# Patient Record
Sex: FEMALE | Race: WHITE | NOT HISPANIC OR LATINO | Employment: OTHER | URBAN - METROPOLITAN AREA
[De-identification: names, ages, dates, MRNs, and addresses within clinical notes are randomized per-mention and may not be internally consistent; named-entity substitution may affect disease eponyms.]

---

## 2017-03-27 ENCOUNTER — FOLLOW UP (OUTPATIENT)
Dept: URBAN - METROPOLITAN AREA CLINIC 33 | Facility: CLINIC | Age: 82
End: 2017-03-27

## 2017-03-27 DIAGNOSIS — H34.8320: ICD-10-CM

## 2017-03-27 DIAGNOSIS — H35.61: ICD-10-CM

## 2017-03-27 DIAGNOSIS — H43.392: ICD-10-CM

## 2017-03-27 DIAGNOSIS — E11.9: ICD-10-CM

## 2017-03-27 DIAGNOSIS — H35.3211: ICD-10-CM

## 2017-03-27 DIAGNOSIS — H35.3122: ICD-10-CM

## 2017-03-27 PROCEDURE — G8427 DOCREV CUR MEDS BY ELIG CLIN: HCPCS

## 2017-03-27 PROCEDURE — 2019F DILATED MACUL EXAM DONE: CPT

## 2017-03-27 PROCEDURE — 4177F TALK PT/CRGVR RE AREDS PREV: CPT

## 2017-03-27 PROCEDURE — 92250 FUNDUS PHOTOGRAPHY W/I&R: CPT

## 2017-03-27 PROCEDURE — 2022F DILAT RTA XM EVC RTNOPTHY: CPT

## 2017-03-27 PROCEDURE — 92014 COMPRE OPH EXAM EST PT 1/>: CPT

## 2017-03-27 PROCEDURE — 1036F TOBACCO NON-USER: CPT

## 2017-03-27 ASSESSMENT — VISUAL ACUITY
OS_SC: 20/80
OD_SC: CF 1FT

## 2017-03-27 ASSESSMENT — TONOMETRY
OD_IOP_MMHG: 12
OS_IOP_MMHG: 13

## 2017-07-03 ENCOUNTER — FOLLOW UP (OUTPATIENT)
Dept: URBAN - METROPOLITAN AREA CLINIC 33 | Facility: CLINIC | Age: 82
End: 2017-07-03

## 2017-07-03 VITALS
BODY MASS INDEX: 24.1 KG/M2 | WEIGHT: 136 LBS | HEART RATE: 61 BPM | HEIGHT: 63 IN | SYSTOLIC BLOOD PRESSURE: 120 MMHG | DIASTOLIC BLOOD PRESSURE: 56 MMHG

## 2017-07-03 DIAGNOSIS — H35.61: ICD-10-CM

## 2017-07-03 DIAGNOSIS — H35.3211: ICD-10-CM

## 2017-07-03 DIAGNOSIS — H43.392: ICD-10-CM

## 2017-07-03 DIAGNOSIS — H34.8320: ICD-10-CM

## 2017-07-03 DIAGNOSIS — E11.9: ICD-10-CM

## 2017-07-03 DIAGNOSIS — H35.3122: ICD-10-CM

## 2017-07-03 PROCEDURE — 92250 FUNDUS PHOTOGRAPHY W/I&R: CPT

## 2017-07-03 PROCEDURE — 1036F TOBACCO NON-USER: CPT

## 2017-07-03 PROCEDURE — 92014 COMPRE OPH EXAM EST PT 1/>: CPT

## 2017-07-03 PROCEDURE — 4177F TALK PT/CRGVR RE AREDS PREV: CPT

## 2017-07-03 PROCEDURE — 2019F DILATED MACUL EXAM DONE: CPT

## 2017-07-03 PROCEDURE — 67028 INJECTION EYE DRUG: CPT

## 2017-07-03 PROCEDURE — G8420 CALC BMI NORM PARAMETERS: HCPCS

## 2017-07-03 PROCEDURE — 2022F DILAT RTA XM EVC RTNOPTHY: CPT

## 2017-07-03 PROCEDURE — G8427 DOCREV CUR MEDS BY ELIG CLIN: HCPCS

## 2017-07-03 ASSESSMENT — TONOMETRY
OS_IOP_MMHG: 15
OD_IOP_MMHG: 11

## 2017-07-03 ASSESSMENT — VISUAL ACUITY: OS_SC: 20/200-1

## 2017-09-22 NOTE — PATIENT DISCUSSION
RETINA IS ATTACHED OU: PVD OS, S/P PPV OD; NO NEW HOLES OR TEARS SEEN ON DILATED EXAM TODAY.  RETINAL DETACHMENT SIGNS AND SYMPTOMS REVIEWED

## 2017-10-09 ENCOUNTER — FOLLOW UP (OUTPATIENT)
Dept: URBAN - METROPOLITAN AREA CLINIC 33 | Facility: CLINIC | Age: 82
End: 2017-10-09

## 2017-10-09 VITALS — HEIGHT: 55 IN | DIASTOLIC BLOOD PRESSURE: 82 MMHG | HEART RATE: 76 BPM | SYSTOLIC BLOOD PRESSURE: 120 MMHG

## 2017-10-09 DIAGNOSIS — H35.61: ICD-10-CM

## 2017-10-09 DIAGNOSIS — H43.392: ICD-10-CM

## 2017-10-09 DIAGNOSIS — H35.3122: ICD-10-CM

## 2017-10-09 DIAGNOSIS — H01.113: ICD-10-CM

## 2017-10-09 DIAGNOSIS — H04.123: ICD-10-CM

## 2017-10-09 DIAGNOSIS — H35.3211: ICD-10-CM

## 2017-10-09 DIAGNOSIS — E11.9: ICD-10-CM

## 2017-10-09 DIAGNOSIS — H01.116: ICD-10-CM

## 2017-10-09 DIAGNOSIS — H34.8320: ICD-10-CM

## 2017-10-09 PROCEDURE — 92134 CPTRZ OPH DX IMG PST SGM RTA: CPT

## 2017-10-09 PROCEDURE — 92014 COMPRE OPH EXAM EST PT 1/>: CPT

## 2017-10-09 PROCEDURE — 4177F TALK PT/CRGVR RE AREDS PREV: CPT

## 2017-10-09 PROCEDURE — G8427 DOCREV CUR MEDS BY ELIG CLIN: HCPCS

## 2017-10-09 PROCEDURE — G8420 CALC BMI NORM PARAMETERS: HCPCS

## 2017-10-09 PROCEDURE — 1036F TOBACCO NON-USER: CPT

## 2017-10-09 PROCEDURE — 67028 INJECTION EYE DRUG: CPT

## 2017-10-09 PROCEDURE — 2019F DILATED MACUL EXAM DONE: CPT

## 2017-10-09 PROCEDURE — 2022F DILAT RTA XM EVC RTNOPTHY: CPT

## 2017-10-09 ASSESSMENT — TONOMETRY
OD_IOP_MMHG: 15
OS_IOP_MMHG: 18

## 2017-10-09 ASSESSMENT — VISUAL ACUITY: OS_SC: 20/400

## 2018-01-15 ENCOUNTER — FOLLOW UP AND POST INJECTION EVALUATION (OUTPATIENT)
Dept: URBAN - METROPOLITAN AREA CLINIC 33 | Facility: CLINIC | Age: 83
End: 2018-01-15

## 2018-01-15 VITALS — HEIGHT: 55 IN | SYSTOLIC BLOOD PRESSURE: 153 MMHG | DIASTOLIC BLOOD PRESSURE: 68 MMHG | HEART RATE: 68 BPM

## 2018-01-15 DIAGNOSIS — H35.3122: ICD-10-CM

## 2018-01-15 DIAGNOSIS — E11.9: ICD-10-CM

## 2018-01-15 DIAGNOSIS — H43.392: ICD-10-CM

## 2018-01-15 DIAGNOSIS — H35.3211: ICD-10-CM

## 2018-01-15 DIAGNOSIS — H34.8320: ICD-10-CM

## 2018-01-15 DIAGNOSIS — H35.61: ICD-10-CM

## 2018-01-15 PROCEDURE — 92014 COMPRE OPH EXAM EST PT 1/>: CPT

## 2018-01-15 PROCEDURE — 92134 CPTRZ OPH DX IMG PST SGM RTA: CPT

## 2018-01-15 PROCEDURE — 67028 INJECTION EYE DRUG: CPT

## 2018-01-15 ASSESSMENT — TONOMETRY
OD_IOP_MMHG: 16
OS_IOP_MMHG: 15

## 2018-01-15 ASSESSMENT — VISUAL ACUITY
OS_SC: 20/80-2
OD_SC: CF 1FT

## 2018-03-23 NOTE — PATIENT DISCUSSION
RETINA IS ATTACHED OU: PVD OS, S/P PPV/MEMBRANE PEEL, OD; NO HOLES OR TEARS SEEN ON DILATED EXAM TODAY.  RETINAL DETACHMENT SIGNS AND SYMPTOMS REVIEWED

## 2018-12-14 NOTE — PATIENT DISCUSSION
RETINA IS ATTACHED OU: S/P PPV/MEMBRANE PEEL, OD; PVD OS; NO HOLES OR TEARS SEEN ON DILATED EXAM TODAY.  RETINAL DETACHMENT SIGNS AND SYMPTOMS REVIEWED

## 2020-06-25 NOTE — PATIENT DISCUSSION
Posterior Capsular Fibrosis/Opacification Counseling: I have discussed the option of glasses versus YAG laser surgery versus  following. It was explained that when vision no longer meets the patient's needs, and when a new prescription for glasses is not likely to improve the patient's visual symptoms, the option of YAG laser surgery is a reasonable next step. It was  explained that there is no guarantee that removing the PCF/PCO will improve their visual symptoms. The risks, benefits and alternatives of surgery were discussed with the patient. The uncommon risk of an increase in intraocular pressure or a retinal detachment and their associated symptoms were explained to the patient. After this discussion, the patient desires to proceed with YAG laser to improve vision for reading and night glare.

## 2020-06-25 NOTE — PATIENT DISCUSSION
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 CONSIDER RE-EVALUATION WITH DR. Lu Quiroz IF YAG LASER AND NEW GLASSES DO NOT IMPROVE HER VISION.

## 2020-07-09 NOTE — PATIENT DISCUSSION
Posterior Capsular Fibrosis/Opacification Counseling: I have discussed the option of glasses versus YAG laser surgery versus  following. It was explained that when vision no longer meets the patient's needs, and when a new prescription for glasses is not likely to improve the patient's visual symptoms, the option of YAG laser surgery is a reasonable next step. It was  explained that there is no guarantee that removing the PCF/PCO will improve their visual symptoms. The risks, benefits and alternatives of surgery were discussed with the patient. The uncommon risk of an increase in intraocular pressure or a retinal detachment and their associated symptoms were explained to the patient. After this discussion, the patient desires to proceed with YAG laser to improve vision for driving at night.

## 2020-07-09 NOTE — PATIENT DISCUSSION
S/P YAG, OD_ - OPERATIVE EYE IS STABLE AND OK TO PROCEED WITH THE FELLOW EYE'S SURGERY. PT REPORTS THAT THEY ARE HAPPY WITH THE OUTCOME OF THE OPERATIVE EYE AND READY TO PURSUE SX IN THE FELLOW EYE.

## 2020-10-20 NOTE — PATIENT DISCUSSION
Trimmed and removed a few sutures today. Patient has a follow up appointment scheduled for 10/22/2020 for residual suture removal and continued post operative care. Patient instructed to call office if there are any issues prior to post op appointment.

## 2020-10-20 NOTE — PATIENT DISCUSSION
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 Other

## 2020-10-22 NOTE — PATIENT DISCUSSION
All sutures removed without difficulty today. Patient is continuing to heal well. Sun avoidance and skin care discussed at length. Sunscreen given. Follow up in one month.

## 2020-11-19 NOTE — PATIENT DISCUSSION
Patient continues to heal well. Gave sample of eye cream today will mail full size eye cream once we receive. Follow up prn.

## 2022-06-04 ENCOUNTER — TELEPHONE ENCOUNTER (OUTPATIENT)
Dept: URBAN - METROPOLITAN AREA CLINIC 68 | Facility: CLINIC | Age: 87
End: 2022-06-04

## 2022-06-05 ENCOUNTER — TELEPHONE ENCOUNTER (OUTPATIENT)
Dept: URBAN - METROPOLITAN AREA CLINIC 68 | Facility: CLINIC | Age: 87
End: 2022-06-05

## 2022-06-25 ENCOUNTER — TELEPHONE ENCOUNTER (OUTPATIENT)
Age: 87
End: 2022-06-25

## 2022-06-26 ENCOUNTER — TELEPHONE ENCOUNTER (OUTPATIENT)
Age: 87
End: 2022-06-26